# Patient Record
Sex: FEMALE | Race: WHITE | NOT HISPANIC OR LATINO | Employment: FULL TIME | URBAN - METROPOLITAN AREA
[De-identification: names, ages, dates, MRNs, and addresses within clinical notes are randomized per-mention and may not be internally consistent; named-entity substitution may affect disease eponyms.]

---

## 2024-03-30 ENCOUNTER — APPOINTMENT (OUTPATIENT)
Dept: RADIOLOGY | Facility: MEDICAL CENTER | Age: 64
End: 2024-03-30
Attending: EMERGENCY MEDICINE
Payer: COMMERCIAL

## 2024-03-30 ENCOUNTER — HOSPITAL ENCOUNTER (EMERGENCY)
Facility: MEDICAL CENTER | Age: 64
End: 2024-03-30
Attending: EMERGENCY MEDICINE
Payer: COMMERCIAL

## 2024-03-30 VITALS
DIASTOLIC BLOOD PRESSURE: 74 MMHG | BODY MASS INDEX: 29.4 KG/M2 | SYSTOLIC BLOOD PRESSURE: 159 MMHG | HEIGHT: 68 IN | HEART RATE: 63 BPM | TEMPERATURE: 98 F | WEIGHT: 194 LBS | RESPIRATION RATE: 20 BRPM | OXYGEN SATURATION: 94 %

## 2024-03-30 DIAGNOSIS — S09.90XA CLOSED HEAD INJURY, INITIAL ENCOUNTER: ICD-10-CM
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
## 2024-03-30 DIAGNOSIS — S16.1XXA STRAIN OF NECK MUSCLE, INITIAL ENCOUNTER: ICD-10-CM

## 2024-03-30 DIAGNOSIS — S40.012A CONTUSION OF LEFT SHOULDER, INITIAL ENCOUNTER: ICD-10-CM

## 2024-03-30 PROCEDURE — 71045 X-RAY EXAM CHEST 1 VIEW: CPT

## 2024-03-30 PROCEDURE — 700102 HCHG RX REV CODE 250 W/ 637 OVERRIDE(OP): Performed by: EMERGENCY MEDICINE

## 2024-03-30 PROCEDURE — 73030 X-RAY EXAM OF SHOULDER: CPT | Mod: LT

## 2024-03-30 PROCEDURE — 72170 X-RAY EXAM OF PELVIS: CPT

## 2024-03-30 PROCEDURE — 99285 EMERGENCY DEPT VISIT HI MDM: CPT

## 2024-03-30 PROCEDURE — 307740 HCHG GREEN TRAUMA TEAM SERVICES

## 2024-03-30 PROCEDURE — 72125 CT NECK SPINE W/O DYE: CPT

## 2024-03-30 PROCEDURE — 70450 CT HEAD/BRAIN W/O DYE: CPT

## 2024-03-30 PROCEDURE — A9270 NON-COVERED ITEM OR SERVICE: HCPCS | Performed by: EMERGENCY MEDICINE

## 2024-03-30 RX ORDER — HYDROCODONE BITARTRATE AND ACETAMINOPHEN 5; 325 MG/1; MG/1
1 TABLET ORAL ONCE
Status: COMPLETED | OUTPATIENT
Start: 2024-03-30 | End: 2024-03-30

## 2024-03-30 RX ORDER — IBUPROFEN 600 MG/1
600 TABLET ORAL ONCE
Status: COMPLETED | OUTPATIENT
Start: 2024-03-30 | End: 2024-03-30

## 2024-03-30 RX ADMIN — IBUPROFEN 600 MG: 600 TABLET, FILM COATED ORAL at 17:56

## 2024-03-30 RX ADMIN — HYDROCODONE BITARTRATE AND ACETAMINOPHEN 1 TABLET: 5; 325 TABLET ORAL at 17:56

## 2024-03-30 ASSESSMENT — PAIN DESCRIPTION - PAIN TYPE: TYPE: ACUTE PAIN

## 2024-03-30 ASSESSMENT — LIFESTYLE VARIABLES: DO YOU DRINK ALCOHOL: YES
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
## 2024-03-30 NOTE — Clinical Note
Anastasiia Baxter was seen and treated in our emergency department on 3/30/2024.  She may return to work on 04/02/2024.       If you have any questions or concerns, please don't hesitate to call.      Peter Moreira D.O.
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
## 2024-03-31 NOTE — ED NOTES
Pt stable for discharge. Pt educated and reviewed discharge instructions with RN. Pt verbalized understanding & all questions were answered. Pt AoX 4. Pt ambulated independently with balanced and steady gait out of the ED with all belongings. Pt encouraged to come back if symptoms worsen.

## 2024-03-31 NOTE — DISCHARGE INSTRUCTIONS
CT scans and x-ray shows no fracture, no brain injury.  You are going to be sore for the next several days to couple weeks.  Use Motrin Tylenol for pain use ice packs for areas of pain.  Activity as tolerated otherwise.  Return for any change or worsening symptoms

## 2024-03-31 NOTE — ED NOTES
"Pt brought to [facility] via [facility]. Pt in c-spine collar. All safety precautions in place. Call light and pt belongings within reach. Pt neuro intact. States they have a dull pan 1/10 on the left shoulder and \"slight headache\".     Pt updated on POC. All questions answered at this time.   " No